# Patient Record
Sex: MALE | Race: WHITE | NOT HISPANIC OR LATINO | Employment: OTHER | URBAN - METROPOLITAN AREA
[De-identification: names, ages, dates, MRNs, and addresses within clinical notes are randomized per-mention and may not be internally consistent; named-entity substitution may affect disease eponyms.]

---

## 2024-10-04 ENCOUNTER — OFFICE VISIT (OUTPATIENT)
Dept: DERMATOLOGY | Facility: CLINIC | Age: 44
End: 2024-10-04
Payer: COMMERCIAL

## 2024-10-04 DIAGNOSIS — Z87.2 HISTORY OF STEVENS-JOHNSON TOXIC EPIDERMAL NECROLYSIS OVERLAP SYNDROME: Primary | ICD-10-CM

## 2024-10-04 DIAGNOSIS — L30.9 HAND DERMATITIS: ICD-10-CM

## 2024-10-04 PROCEDURE — 99203 OFFICE O/P NEW LOW 30 MIN: CPT | Performed by: NURSE PRACTITIONER

## 2024-10-04 NOTE — PROGRESS NOTES
Subjective   Adam Dettweiler is a 44 y.o. male who presents for the following: SJS follow-up.      Objective   Well appearing patient in no apparent distress; mood and affect are within normal limits.    A focused examination was performed including upper extremities, including the arms, hands, fingers, and fingernails, head, including the scalp, face, neck, nose, ears, eyelids, and lips, and chest, axillae, abdomen, back, and buttocks. All findings within normal limits unless otherwise noted below.    Post inflammatory hyperpigmentation where rash has resolved.     Palm, left hand, Palm, right hand  Skin clear today, often flares during winter/fall months, lasts 2-3 weeks.       Assessment/Plan   History of Barr-Wallace toxic epidermal necrolysis overlap syndrome    - Recent hospitalization for SJS due to allopurinol, doing well today.   Post-acute sequelae of SJS:  -  Skin, hair, nails: Hyper- and hypopigmentation that may be permanent and abnormal scarring, including hypertrophic scars and keloids. - A variety of nail changes have also been reported, occurring in up to 50% of patients, especially those who have experienced more severe disease.   - Other organ systems may be affected including eyes, lungs, and GI tract. In addition, there are reports of connective tissue diseases including systemic lupus erythematosus, Sjögren syndrome, or Sjögren-like syndrome arising after SJS/TEN. Worsening of chronic renal disease after SJS/TEN has also been reported.      Hand dermatitis  Palm, left hand; Palm, right hand    Hand dermatitis  - This can be caused by irritants, allergies, frequent water exposure, or skin disease. Hand dermatitis may cause itching, swelling, or blisters. Without treatment, the skin may become thick, cracked, or scaly, and bleed.  - Most of the time, hand dermatitis is caused by coming into contact with something that irritates the skin, such as chemicals, or contact with something that causes  allergic reactions in some people, such as latex gloves or poison ivy. Work that requires keeping your hands wet can also irritate the skin of your palms.  - Avoid the environmental trigger that is causing the reaction. Wear gloves for work that involves chemicals or submerging your hands in water.  Plan  - Regular use of moisturizer or petroleum jelly, especially after bathing and hand washing, can reduce irritation.  - Wash hands in cold water, if possible.    - Please call if the condition flares, I can send a topical corticosteroid.   -  Risks, benefits, side effects, alternatives and options were discussed with patient and the patient voiced understanding.      Please call me if there are any changes or development of concerning symptoms (lesion/skin condition is changing, bleeding, enlarging, or worsening).

## 2024-10-18 ENCOUNTER — PATIENT MESSAGE (OUTPATIENT)
Dept: DERMATOLOGY | Facility: CLINIC | Age: 44
End: 2024-10-18
Payer: COMMERCIAL

## 2024-10-18 DIAGNOSIS — L30.9 HAND DERMATITIS: Primary | ICD-10-CM

## 2024-10-18 RX ORDER — TRIAMCINOLONE ACETONIDE 1 MG/G
CREAM TOPICAL
Qty: 80 G | Refills: 3 | Status: SHIPPED | OUTPATIENT
Start: 2024-10-18